# Patient Record
Sex: FEMALE | Race: WHITE | NOT HISPANIC OR LATINO | URBAN - METROPOLITAN AREA
[De-identification: names, ages, dates, MRNs, and addresses within clinical notes are randomized per-mention and may not be internally consistent; named-entity substitution may affect disease eponyms.]

---

## 2019-01-31 ENCOUNTER — OFFICE (OUTPATIENT)
Dept: URBAN - METROPOLITAN AREA CLINIC 14 | Facility: CLINIC | Age: 54
End: 2019-01-31

## 2019-01-31 VITALS
HEIGHT: 63 IN | HEART RATE: 66 BPM | RESPIRATION RATE: 16 BRPM | DIASTOLIC BLOOD PRESSURE: 77 MMHG | WEIGHT: 151 LBS | SYSTOLIC BLOOD PRESSURE: 119 MMHG

## 2019-01-31 DIAGNOSIS — R10.11 RIGHT UPPER QUADRANT PAIN: ICD-10-CM

## 2019-01-31 DIAGNOSIS — R14.0 ABDOMINAL DISTENSION (GASEOUS): ICD-10-CM

## 2019-01-31 DIAGNOSIS — R19.4 CHANGE IN BOWEL HABIT: ICD-10-CM

## 2019-01-31 DIAGNOSIS — K58.0 IRRITABLE BOWEL SYNDROME WITH DIARRHEA: ICD-10-CM

## 2019-01-31 LAB
H PYLORI BREATH TEST: NEGATIVE
H. PYLORI BREATH COLLECTION: (no result)

## 2019-01-31 PROCEDURE — 99204 OFFICE O/P NEW MOD 45 MIN: CPT | Performed by: INTERNAL MEDICINE

## 2019-01-31 RX ORDER — HYOSCYAMINE SULFATE 0.12 MG/1
TABLET ORAL; SUBLINGUAL
Qty: 90 | Refills: 3 | Status: COMPLETED
Start: 2019-01-31 | End: 2019-02-25

## 2019-01-31 RX ORDER — OMEPRAZOLE 40 MG/1
40 CAPSULE, DELAYED RELEASE ORAL
Qty: 30 | Refills: 11 | Status: COMPLETED
Start: 2019-01-31 | End: 2019-02-25

## 2019-01-31 NOTE — SERVICENOTES
Patient's symptoms overall appear unchanged though she does have some increase in bloating and similar diarrhea.  I do not believe that we need to repeat any endoscopic evaluation given the negative endoscopic evaluation three years ago but I would like to recheck stool samples to ensure no infection and check abdominal ultrasound.  I will give her a trial of antispasmodic to see if this may help her upper abdominal pain and rectal spasm.  The differential does include IBS but also includes bacterial overgrowth and post cholecystectomy diarrhea.  If stool studies are negative we may give her a trial of Xifaxan.  If this does not help then we can consider putting her back on colestipol or trial of TCA. We can also consider CT scan.

## 2019-01-31 NOTE — SERVICEHPINOTES
Ms. Ibrahim is a 53-year-old female here for evaluation of chronic abdominal complaints including bloating, abdominal pain, and diarrhea. She was initially seen by me in June 2015 because of similar symptoms. She has had her gallbladder taken out around 2013 and since then has had some right upper quadrant discomfort with occasional diarrhea. When I saw her in 2015 she had normal blood work as well as normal stool studies. Celiac labs were negative as were inflammatory markers. EGD and colonoscopy in July 2015 did reveal some bile pooling in the stomach and some mild distal esophagitis. Biopsies of the esophagus confirmed reflux esophagitis. Stomach and duodenal biopsies were negative. Colonoscopy was normal to the terminal ileum. Colon biopsies were negative. Unfortunately the patient never followed back up. She states that she has had persistent similar symptoms over the past three years. She is actually also has some constipation issues for which she takes Linzess on an intermittent basis. Over the past few months she has had worsening symptoms including bloating and abdominal distention. She also has upper abdominal burning mainly in the right upper quadrant. She tends toward diarrhea stating that she will have a few loose bowel movements a day and she also has intermittent episodes of rectal spasm. She does not chew gum her drink sodas but does admit to eating broccoli and some artificial sweeteners. She has had a 25 lb weight gain over the past year and she does admit to eating out a lot. We did place her on a PPI but she no longer takes this. We also recommended a trial of cholestyramine but she no longer takes this.

## 2019-02-25 ENCOUNTER — OFFICE (OUTPATIENT)
Dept: URBAN - METROPOLITAN AREA CLINIC 11 | Facility: CLINIC | Age: 54
End: 2019-02-25

## 2019-02-25 VITALS
HEIGHT: 63 IN | WEIGHT: 152 LBS | DIASTOLIC BLOOD PRESSURE: 77 MMHG | HEART RATE: 64 BPM | SYSTOLIC BLOOD PRESSURE: 120 MMHG

## 2019-02-25 DIAGNOSIS — R05 COUGH: ICD-10-CM

## 2019-02-25 DIAGNOSIS — K76.89 OTHER SPECIFIED DISEASES OF LIVER: ICD-10-CM

## 2019-02-25 DIAGNOSIS — R10.11 RIGHT UPPER QUADRANT PAIN: ICD-10-CM

## 2019-02-25 DIAGNOSIS — K58.0 IRRITABLE BOWEL SYNDROME WITH DIARRHEA: ICD-10-CM

## 2019-02-25 PROCEDURE — 99215 OFFICE O/P EST HI 40 MIN: CPT | Performed by: INTERNAL MEDICINE

## 2019-02-25 RX ORDER — PANTOPRAZOLE SODIUM 40 MG/1
40 TABLET, DELAYED RELEASE ORAL
Qty: 90 | Refills: 0 | Status: COMPLETED
Start: 2019-02-25 | End: 2022-02-22

## 2019-02-25 RX ORDER — RIFAXIMIN 550 MG/1
TABLET ORAL
Qty: 42 | Refills: 0 | Status: COMPLETED
Start: 2019-02-25 | End: 2022-02-22

## 2019-02-25 NOTE — SERVICEHPINOTES
"Just the same symptoms."  She stated that she has been having issues for two years (though her chart dates back over four years with the same problems) worsening over the last summer.  She has variable stools from feeling constipated to diarrhea and even has had 5 episodes of diarrhea at night.  The stools may start a feeling of constipatoin followed by formed stools and then small bits of stool and then "mike stools".  This may all occur in the same day. This occurs with some bloating.  She has had negative bx in 2015.  She was given a script for Xifaxan earlier this year and did not take it stating she did not have parasites. She has had "several years" of a pain in the RUQ.  It is a burning with bloating.  The pain is nearly constant.  It varies with some bending and standing.  It sometimes is throbbing. It is a a "2-3" type pain.  It may be food related but not consisently.  She had a lap ty in 2013 for a positive HIDA.She has reported some issues with hoarseness and a cough since about last August. The cough has been a dry cough with a "tickle".  She has not had chest pain issues, heartburn, regurgitation, n/v, or dysphagia. She has not been on an ACE-I. She reported having bronchitis last year and also having a normal CXR in December. She had an u/s with a noted benign appearing right hepatic lobe cyst measuring 10cm.  She reported having a hemangioma or cyst in her 30s noted by a Gyn. She has had normal LFTS and has not had hepatitis. She is quite confused about her GI providers and really mixes up her studies, symptoms, and timing. She has been trying to lose weight and has lost 8lbs since January.She is on Prozac and Trazodone for depression.

## 2019-02-25 NOTE — SERVICENOTES
We reviewed her prior studies and clinical notes. As to her IBS type issues, I agree with the prior plan to try Xifaxan and holding on bacterial overgrowth studies.  We discussed IBS/bacterial overgrowth issues, dietary changes, and the use of the Xifaxan.  If her sx do not improve, we can then consider repeat bx of the colon and duodenum.  We discussed her hepatic cyst, a f/u u/s in about 6 mos, the typical benign nature of the cysts and that they are usually without symptoms.  We discussed her RUQ point pain and that this seems to be muscular skeletal but did discuss a further dif dx including ulcer disease, gastritis, duodenitis, pulmonary issues, liver disease, etc...  We discussed a trial of Neurontin after her other new meds are completed.  As to her cough, this may or may not be GERD related.  I would try her on Protonix and if it persists, we can discuss an EGD with esophageal bx or BRAVO pH study.  Overall, I suspect there is a fair component of anxiety/depression at play as well.

## 2022-02-22 ENCOUNTER — OFFICE (OUTPATIENT)
Dept: URBAN - METROPOLITAN AREA CLINIC 11 | Facility: CLINIC | Age: 57
End: 2022-02-22

## 2022-02-22 VITALS
OXYGEN SATURATION: 99 % | HEIGHT: 63 IN | SYSTOLIC BLOOD PRESSURE: 114 MMHG | WEIGHT: 145 LBS | DIASTOLIC BLOOD PRESSURE: 78 MMHG | HEART RATE: 78 BPM

## 2022-02-22 DIAGNOSIS — K76.89 OTHER SPECIFIED DISEASES OF LIVER: ICD-10-CM

## 2022-02-22 DIAGNOSIS — K59.00 CONSTIPATION, UNSPECIFIED: ICD-10-CM

## 2022-02-22 DIAGNOSIS — K92.0 HEMATEMESIS: ICD-10-CM

## 2022-02-22 DIAGNOSIS — R10.11 RIGHT UPPER QUADRANT PAIN: ICD-10-CM

## 2022-02-22 PROCEDURE — 99214 OFFICE O/P EST MOD 30 MIN: CPT | Performed by: NURSE PRACTITIONER

## 2022-02-22 RX ORDER — OMEPRAZOLE 40 MG/1
80 CAPSULE, DELAYED RELEASE ORAL
Qty: 60 | Refills: 5 | Status: COMPLETED
Start: 2022-02-22 | End: 2023-07-20

## 2022-02-22 RX ORDER — LUBIPROSTONE 8 UG/1
8 CAPSULE, GELATIN COATED ORAL
Qty: 30 | Refills: 2 | Status: COMPLETED
Start: 2022-02-22 | End: 2023-07-20

## 2022-02-22 NOTE — SERVICENOTES
We discussed her RUQ pain/burning and differentials including ulcers, musculoskeletal issues etc. We discussed starting her on a PPI and getting and EGD. Discussed the procedure including r/b/a. We discussed her history of liver cyst. We will get an ultrasound to assess changes. We will obtain her labs done at her PCP as well. We discussed her chronic constipation and then diarrhea with taking Linzess. We will change her to Amitiza at this time.

## 2022-02-22 NOTE — SERVICEHPINOTES
She notes that she went to [employer] on Friday. She developed chills when she arrived at the restaurants. She then developed nausea, and she then vomiting. Right before vomiting she drank a couple of sips of wine. She notes that the emesis did have a small amount of red twice. She has not had episodes of vomiting like this in the past. She denies feeling heartburn and reflux. She notes that she does have some RUQ burning. She notes that this is a new occurrence. It occurs intermittently. Today, it has been present all day. No alleviating factors. No particular aggravating factors. She does not take NSAIDs frequently. She denies melena.. 
devon osorio She notes that her bowel movements vary from constipation to diarrhea. She notes that she has more constipation than diarrhea. She notes that when she is constipated she will go 3-4 days without a bowel movement or have a bowel movement consisting of small pieces of stool. When she is constipated she will then take Linzess and have diarrhea. She has taken Miralax with no improvement. She will develop diarrhea with Linzess. devon Mac notes that she has a history of anal spasms for years which she takes Hyoscyamine with good improvement.  
devon Mac has a history of liver cyst noted on imaging. She had recent imaging from Dr. Marie of liver. She also had labs done at her PCP as well.

## 2022-04-07 ENCOUNTER — OFFICE (OUTPATIENT)
Dept: URBAN - METROPOLITAN AREA PATHOLOGY 22 | Facility: PATHOLOGY | Age: 57
End: 2022-04-07
Payer: COMMERCIAL

## 2022-04-07 ENCOUNTER — AMBULATORY SURGICAL CENTER (OUTPATIENT)
Dept: URBAN - METROPOLITAN AREA SURGERY 3 | Facility: SURGERY | Age: 57
End: 2022-04-07
Payer: COMMERCIAL

## 2022-04-07 ENCOUNTER — AMBULATORY SURGICAL CENTER (OUTPATIENT)
Dept: URBAN - METROPOLITAN AREA SURGERY 3 | Facility: SURGERY | Age: 57
End: 2022-04-07

## 2022-04-07 ENCOUNTER — OFFICE (OUTPATIENT)
Dept: URBAN - METROPOLITAN AREA CLINIC 19 | Facility: CLINIC | Age: 57
End: 2022-04-07

## 2022-04-07 VITALS
HEART RATE: 75 BPM | SYSTOLIC BLOOD PRESSURE: 117 MMHG | OXYGEN SATURATION: 95 % | HEART RATE: 76 BPM | DIASTOLIC BLOOD PRESSURE: 77 MMHG | OXYGEN SATURATION: 97 % | TEMPERATURE: 97.3 F | TEMPERATURE: 97.3 F | SYSTOLIC BLOOD PRESSURE: 133 MMHG | DIASTOLIC BLOOD PRESSURE: 67 MMHG | HEART RATE: 72 BPM | HEIGHT: 63 IN | WEIGHT: 136 LBS | DIASTOLIC BLOOD PRESSURE: 74 MMHG | DIASTOLIC BLOOD PRESSURE: 67 MMHG | OXYGEN SATURATION: 96 % | HEIGHT: 63 IN | OXYGEN SATURATION: 96 % | RESPIRATION RATE: 18 BRPM | HEART RATE: 72 BPM | DIASTOLIC BLOOD PRESSURE: 73 MMHG | RESPIRATION RATE: 18 BRPM | WEIGHT: 136 LBS | OXYGEN SATURATION: 97 % | HEART RATE: 67 BPM | OXYGEN SATURATION: 99 % | RESPIRATION RATE: 20 BRPM | SYSTOLIC BLOOD PRESSURE: 117 MMHG | TEMPERATURE: 97.5 F | SYSTOLIC BLOOD PRESSURE: 114 MMHG | RESPIRATION RATE: 18 BRPM | TEMPERATURE: 97.3 F | SYSTOLIC BLOOD PRESSURE: 117 MMHG | DIASTOLIC BLOOD PRESSURE: 67 MMHG | HEART RATE: 75 BPM | OXYGEN SATURATION: 97 % | DIASTOLIC BLOOD PRESSURE: 73 MMHG | TEMPERATURE: 97.5 F | OXYGEN SATURATION: 99 % | RESPIRATION RATE: 20 BRPM | SYSTOLIC BLOOD PRESSURE: 114 MMHG | DIASTOLIC BLOOD PRESSURE: 77 MMHG | RESPIRATION RATE: 21 BRPM | SYSTOLIC BLOOD PRESSURE: 108 MMHG | SYSTOLIC BLOOD PRESSURE: 109 MMHG | SYSTOLIC BLOOD PRESSURE: 108 MMHG | HEART RATE: 76 BPM | HEART RATE: 70 BPM | OXYGEN SATURATION: 99 % | DIASTOLIC BLOOD PRESSURE: 77 MMHG | DIASTOLIC BLOOD PRESSURE: 74 MMHG | TEMPERATURE: 97.5 F | DIASTOLIC BLOOD PRESSURE: 74 MMHG | HEART RATE: 67 BPM | HEART RATE: 75 BPM | WEIGHT: 136 LBS | HEART RATE: 70 BPM | OXYGEN SATURATION: 95 % | HEART RATE: 72 BPM | SYSTOLIC BLOOD PRESSURE: 133 MMHG | RESPIRATION RATE: 21 BRPM | HEART RATE: 70 BPM | OXYGEN SATURATION: 95 % | HEART RATE: 76 BPM | SYSTOLIC BLOOD PRESSURE: 114 MMHG | SYSTOLIC BLOOD PRESSURE: 109 MMHG | SYSTOLIC BLOOD PRESSURE: 108 MMHG | SYSTOLIC BLOOD PRESSURE: 109 MMHG | RESPIRATION RATE: 20 BRPM | DIASTOLIC BLOOD PRESSURE: 73 MMHG | HEIGHT: 63 IN | OXYGEN SATURATION: 96 % | HEART RATE: 67 BPM | SYSTOLIC BLOOD PRESSURE: 133 MMHG | RESPIRATION RATE: 21 BRPM

## 2022-04-07 DIAGNOSIS — K76.89 OTHER SPECIFIED DISEASES OF LIVER: ICD-10-CM

## 2022-04-07 DIAGNOSIS — R13.10 DYSPHAGIA, UNSPECIFIED: ICD-10-CM

## 2022-04-07 DIAGNOSIS — K31.7 POLYP OF STOMACH AND DUODENUM: ICD-10-CM

## 2022-04-07 DIAGNOSIS — K29.50 UNSPECIFIED CHRONIC GASTRITIS WITHOUT BLEEDING: ICD-10-CM

## 2022-04-07 DIAGNOSIS — R19.7 DIARRHEA, UNSPECIFIED: ICD-10-CM

## 2022-04-07 DIAGNOSIS — R10.13 EPIGASTRIC PAIN: ICD-10-CM

## 2022-04-07 DIAGNOSIS — R10.11 RIGHT UPPER QUADRANT PAIN: ICD-10-CM

## 2022-04-07 PROBLEM — K31.89 OTHER DISEASES OF STOMACH AND DUODENUM: Status: ACTIVE | Noted: 2022-04-07

## 2022-04-07 PROCEDURE — 88313 SPECIAL STAINS GROUP 2: CPT | Performed by: PATHOLOGY

## 2022-04-07 PROCEDURE — 88342 IMHCHEM/IMCYTCHM 1ST ANTB: CPT | Performed by: PATHOLOGY

## 2022-04-07 PROCEDURE — 88305 TISSUE EXAM BY PATHOLOGIST: CPT | Performed by: PATHOLOGY

## 2022-04-07 PROCEDURE — 43239 EGD BIOPSY SINGLE/MULTIPLE: CPT | Performed by: INTERNAL MEDICINE

## 2022-04-07 PROCEDURE — 76705 ECHO EXAM OF ABDOMEN: CPT | Mod: TC | Performed by: NURSE PRACTITIONER

## 2022-04-07 RX ORDER — OMEPRAZOLE 40 MG/1
80 CAPSULE, DELAYED RELEASE ORAL
Qty: 60 | Refills: 5 | Status: COMPLETED
Start: 2022-02-22 | End: 2023-07-20

## 2023-07-20 ENCOUNTER — OFFICE (OUTPATIENT)
Dept: URBAN - METROPOLITAN AREA CLINIC 11 | Facility: CLINIC | Age: 58
End: 2023-07-20

## 2023-07-20 VITALS
DIASTOLIC BLOOD PRESSURE: 87 MMHG | WEIGHT: 147 LBS | HEIGHT: 63 IN | SYSTOLIC BLOOD PRESSURE: 139 MMHG | HEART RATE: 67 BPM | OXYGEN SATURATION: 95 %

## 2023-07-20 DIAGNOSIS — K58.0 IRRITABLE BOWEL SYNDROME WITH DIARRHEA: ICD-10-CM

## 2023-07-20 DIAGNOSIS — K59.4 ANAL SPASM: ICD-10-CM

## 2023-07-20 DIAGNOSIS — R14.0 ABDOMINAL DISTENSION (GASEOUS): ICD-10-CM

## 2023-07-20 DIAGNOSIS — K76.89 OTHER SPECIFIED DISEASES OF LIVER: ICD-10-CM

## 2023-07-20 PROCEDURE — 99214 OFFICE O/P EST MOD 30 MIN: CPT | Performed by: INTERNAL MEDICINE

## 2023-11-20 ENCOUNTER — OFFICE (OUTPATIENT)
Dept: URBAN - METROPOLITAN AREA CLINIC 11 | Facility: CLINIC | Age: 58
End: 2023-11-20
Payer: COMMERCIAL

## 2023-11-20 DIAGNOSIS — R19.7 DIARRHEA, UNSPECIFIED: ICD-10-CM

## 2023-11-20 DIAGNOSIS — R14.0 ABDOMINAL DISTENSION (GASEOUS): ICD-10-CM

## 2023-11-20 PROCEDURE — 91065 BREATH HYDROGEN/METHANE TEST: CPT | Performed by: INTERNAL MEDICINE

## 2023-12-27 ENCOUNTER — AMBULATORY SURGICAL CENTER (OUTPATIENT)
Dept: URBAN - METROPOLITAN AREA SURGERY 3 | Facility: SURGERY | Age: 58
End: 2023-12-27
Payer: COMMERCIAL

## 2023-12-27 ENCOUNTER — OFFICE (OUTPATIENT)
Dept: URBAN - METROPOLITAN AREA PATHOLOGY 12 | Facility: PATHOLOGY | Age: 58
End: 2023-12-27

## 2023-12-27 VITALS
TEMPERATURE: 98 F | WEIGHT: 148 LBS | RESPIRATION RATE: 16 BRPM | RESPIRATION RATE: 17 BRPM | HEART RATE: 81 BPM | HEART RATE: 80 BPM | DIASTOLIC BLOOD PRESSURE: 72 MMHG | WEIGHT: 148 LBS | HEART RATE: 80 BPM | HEART RATE: 70 BPM | OXYGEN SATURATION: 97 % | TEMPERATURE: 98.2 F | RESPIRATION RATE: 18 BRPM | TEMPERATURE: 98.2 F | OXYGEN SATURATION: 95 % | DIASTOLIC BLOOD PRESSURE: 94 MMHG | SYSTOLIC BLOOD PRESSURE: 128 MMHG | HEART RATE: 70 BPM | DIASTOLIC BLOOD PRESSURE: 78 MMHG | HEART RATE: 80 BPM | RESPIRATION RATE: 20 BRPM | DIASTOLIC BLOOD PRESSURE: 72 MMHG | OXYGEN SATURATION: 96 % | SYSTOLIC BLOOD PRESSURE: 113 MMHG | HEART RATE: 81 BPM | RESPIRATION RATE: 17 BRPM | SYSTOLIC BLOOD PRESSURE: 128 MMHG | RESPIRATION RATE: 20 BRPM | HEART RATE: 86 BPM | RESPIRATION RATE: 18 BRPM | DIASTOLIC BLOOD PRESSURE: 81 MMHG | RESPIRATION RATE: 16 BRPM | DIASTOLIC BLOOD PRESSURE: 78 MMHG | HEART RATE: 86 BPM | DIASTOLIC BLOOD PRESSURE: 78 MMHG | DIASTOLIC BLOOD PRESSURE: 85 MMHG | TEMPERATURE: 98 F | SYSTOLIC BLOOD PRESSURE: 128 MMHG | OXYGEN SATURATION: 97 % | RESPIRATION RATE: 20 BRPM | SYSTOLIC BLOOD PRESSURE: 106 MMHG | HEART RATE: 86 BPM | HEIGHT: 63 IN | SYSTOLIC BLOOD PRESSURE: 106 MMHG | SYSTOLIC BLOOD PRESSURE: 113 MMHG | DIASTOLIC BLOOD PRESSURE: 81 MMHG | DIASTOLIC BLOOD PRESSURE: 85 MMHG | SYSTOLIC BLOOD PRESSURE: 121 MMHG | OXYGEN SATURATION: 95 % | DIASTOLIC BLOOD PRESSURE: 72 MMHG | OXYGEN SATURATION: 100 % | SYSTOLIC BLOOD PRESSURE: 121 MMHG | OXYGEN SATURATION: 97 % | OXYGEN SATURATION: 96 % | DIASTOLIC BLOOD PRESSURE: 85 MMHG | OXYGEN SATURATION: 100 % | SYSTOLIC BLOOD PRESSURE: 121 MMHG | DIASTOLIC BLOOD PRESSURE: 94 MMHG | OXYGEN SATURATION: 96 % | SYSTOLIC BLOOD PRESSURE: 106 MMHG | HEART RATE: 81 BPM | TEMPERATURE: 98.2 F | OXYGEN SATURATION: 95 % | TEMPERATURE: 98 F | SYSTOLIC BLOOD PRESSURE: 113 MMHG | DIASTOLIC BLOOD PRESSURE: 81 MMHG | OXYGEN SATURATION: 100 % | HEIGHT: 63 IN | DIASTOLIC BLOOD PRESSURE: 94 MMHG | RESPIRATION RATE: 18 BRPM | HEIGHT: 63 IN | RESPIRATION RATE: 17 BRPM | HEART RATE: 70 BPM | RESPIRATION RATE: 16 BRPM | WEIGHT: 148 LBS

## 2023-12-27 DIAGNOSIS — K59.9 FUNCTIONAL INTESTINAL DISORDER, UNSPECIFIED: ICD-10-CM

## 2023-12-27 DIAGNOSIS — R19.7 DIARRHEA, UNSPECIFIED: ICD-10-CM

## 2023-12-27 PROCEDURE — 88305 TISSUE EXAM BY PATHOLOGIST: CPT | Mod: TC | Performed by: STUDENT IN AN ORGANIZED HEALTH CARE EDUCATION/TRAINING PROGRAM

## 2023-12-27 PROCEDURE — 45331 SIGMOIDOSCOPY AND BIOPSY: CPT | Performed by: INTERNAL MEDICINE

## 2023-12-27 RX ORDER — RIFAXIMIN 550 MG/1
TABLET ORAL
Qty: 42 | Refills: 0 | Status: COMPLETED
Start: 2023-12-04 | End: 2023-12-27

## 2023-12-27 RX ORDER — METRONIDAZOLE 250 MG/1
750 TABLET ORAL
Qty: 42 | Refills: 0 | Status: COMPLETED
Start: 2023-12-04 | End: 2023-12-27

## 2023-12-29 LAB
GASTRO ONE PATHOLOGY: PDF REPORT: (no result)